# Patient Record
Sex: FEMALE | Race: BLACK OR AFRICAN AMERICAN | NOT HISPANIC OR LATINO | URBAN - METROPOLITAN AREA
[De-identification: names, ages, dates, MRNs, and addresses within clinical notes are randomized per-mention and may not be internally consistent; named-entity substitution may affect disease eponyms.]

---

## 2017-02-20 ENCOUNTER — OFFICE VISIT (OUTPATIENT)
Dept: FAMILY MEDICINE | Facility: CLINIC | Age: 22
End: 2017-02-20

## 2017-02-20 VITALS
BODY MASS INDEX: 41.43 KG/M2 | SYSTOLIC BLOOD PRESSURE: 115 MMHG | HEART RATE: 85 BPM | WEIGHT: 270.2 LBS | OXYGEN SATURATION: 99 % | DIASTOLIC BLOOD PRESSURE: 81 MMHG | TEMPERATURE: 99.1 F

## 2017-02-20 DIAGNOSIS — Z00.129 ENCOUNTER FOR ROUTINE CHILD HEALTH EXAMINATION WITHOUT ABNORMAL FINDINGS: ICD-10-CM

## 2017-02-20 DIAGNOSIS — N92.6 MISSED PERIODS: Primary | ICD-10-CM

## 2017-02-20 DIAGNOSIS — F32.0 MILD SINGLE CURRENT EPISODE OF MAJOR DEPRESSIVE DISORDER (H): ICD-10-CM

## 2017-02-20 DIAGNOSIS — Z11.3 SCREEN FOR STD (SEXUALLY TRANSMITTED DISEASE): ICD-10-CM

## 2017-02-20 LAB
BACTERIA: NORMAL
CLUE CELLS: NORMAL
HCG UR QL: NEGATIVE
HIV 1+2 AB+HIV1 P24 AG SERPL QL IA: NEGATIVE
MOTILE TRICHOMONAS: NEGATIVE
ODOR: NORMAL
PH WET PREP: NORMAL
WBC WET PREP: NORMAL
YEAST: NORMAL

## 2017-02-20 NOTE — MR AVS SNAPSHOT
After Visit Summary   2/20/2017    Hector Rm    MRN: [id number]           Patient Information     Date Of Birth          1995        Visit Information        Provider Department      2/20/2017 2:10 PM Benoit Mak MD [facility]        Today's Diagnoses     Missed periods    -  1    Screen for STD (sexually transmitted disease)        Mild single current episode of major depressive disorder (H)           Follow-ups after your visit        Who to contact     Please call your clinic at [phone] to:    Ask questions about your health    Make or cancel appointments    Discuss your medicines    Learn about your test results    Speak to your doctor   If you have compliments or concerns about an experience at your clinic, or if you wish to file a complaint, please contact [facility] Patient Relations at [phone] or email us at [email or web address]         Additional Information About Your Visit        Care EveryWhere ID     This is your Care EveryWhere ID. This could be used by other organizations to access your [facility] medical records  [id number]        Your Vitals Were     Pulse Temperature Pulse Oximetry BMI (Body Mass Index)          85 99.1  F (37.3  C) (Oral) 99% 41.43 kg/m2         Blood Pressure from Last 3 Encounters:   02/20/17 115/81   11/08/16 107/68   10/28/16 100/63    Weight from Last 3 Encounters:   02/20/17 270 lb 3.2 oz (122.6 kg)   10/28/16 258 lb 12.8 oz (117.4 kg) (>99 %)*   12/04/15 248 lb 9.6 oz (112.8 kg) (>99 %)*     * Growth percentiles are based on CDC 2-20 Years data.              We Performed the Following     Chlamydia/Gono Amplified ([facility])     HCG Qualitative Urine (UPT)  ([facility])     HIV Ag/Ab Screen [place] ([facility])     Syphilis Screen [place] ([facility])     Wet Prep ([facility])          Today's Medication Changes          These changes are accurate as of: 2/20/17  3:17 PM.  If you have any  questions, ask your nurse or doctor.               Start taking these medicines.        Dose/Directions    FLUoxetine 20 MG capsule   Commonly known as:  PROzac   Used for:  Mild single current episode of major depressive disorder (H)   Started by:  Benoit Mak MD        Dose:  20 mg   Take 1 capsule (20 mg) by mouth daily   Quantity:  30 capsule   Refills:  1            Where to get your medicines      These medications were sent to [facility] - [place], [place] - [address], [place] [address]     Phone:  [phone]     FLUoxetine 20 MG capsule                Primary Care Provider Office Phone # Fax #    Jasmina Enoch Tracey [phone]       [facility] [address]  [facility] [address]        Thank you!     Thank you for choosing [facility]  for your care. Our goal is always to provide you with excellent care. Hearing back from our patients is one way we can continue to improve our services. Please take a few minutes to complete the written survey that you may receive in the mail after your visit with us. Thank you!             Your Updated Medication List - Protect others around you: Learn how to safely use, store and throw away your medicines at www.disposemymeds.org.          This list is accurate as of: 2/20/17  3:17 PM.  Always use your most recent med list.                   Brand Name Dispense Instructions for use    FLUoxetine 20 MG capsule    PROzac    30 capsule    Take 1 capsule (20 mg) by mouth daily       medroxyPROGESTERone 150 MG/ML injection    DEPO-PROVERA    1 mL    Inject 1 mL (150 mg) into the muscle every 3 months

## 2017-02-21 LAB
C TRACH RRNA CVX QL NAA+PROBE: NEGATIVE
N GONORRHOEA RRNA SPEC QL NAA+PROBE: NEGATIVE
RPR SER QL: NORMAL

## 2017-02-21 ASSESSMENT — PATIENT HEALTH QUESTIONNAIRE - PHQ9: SUM OF ALL RESPONSES TO PHQ QUESTIONS 1-9: 22

## 2017-02-22 ENCOUNTER — TELEPHONE (OUTPATIENT)
Dept: FAMILY MEDICINE | Facility: CLINIC | Age: 22
End: 2017-02-22

## 2017-02-22 NOTE — TELEPHONE ENCOUNTER
Gave pt the following results.   /LuisRN        Component      Latest Ref Rng & Units 2/20/2017   Yeast Wet Prep      none None   Motile Trichomonas Wet Prep      Negative Negative   Clue Cells Wet Prep      NONE Present <20%   WBC WET PREP      2 - 5 2-5   Bacteria Wet Prep      None Few   pH Wet Prep      3.8 - 4.5 Not performed   Odor Wet Prep      NONE None   Chlamydia trac,Amplified Prb      Negative Negative   N gonorrhoeae,Amplified Prb      Negative Negative   HCG Qual Urine      Negative NEGATIVE   Syphilis Screen [place]      Non-Reactive Non-Reactive   HIV Antigen/Antibody      Negative Negative

## 2017-02-22 NOTE — TELEPHONE ENCOUNTER
[facility] phone call message- patient requesting results:    Test: Lab    Date of test: 2/20/17    Additional Comments: Patient would like a call back about her lab results.     OK to leave a message on voice mail? Yes      Primary language: English      needed? No    Call taken on February 22, 2017 at 10:37 AM by Domenic Fagan

## 2017-02-22 NOTE — PROGRESS NOTES
"  Child & Teen Check Up Year 18-20         Health History       Growth Percentile:    Wt Readings from Last 3 Encounters:   02/20/17 270 lb 3.2 oz (122.6 kg)   10/28/16 258 lb 12.8 oz (117.4 kg) (>99 %)*   12/04/15 248 lb 9.6 oz (112.8 kg) (>99 %)*     * Growth percentiles are based on [facility] 2-20 Years data.      Ht Readings from Last 2 Encounters:   10/28/16 5' 7.72\" (172 cm) (91 %)*   12/04/15 5' 6.75\" (169.5 cm) (83 %)*     * Growth percentiles are based on [facility] 2-20 Years data.    Normalized BMI data available only for age 0 to 20 years.    Visit Vitals: /81  Pulse 85  Temp 99.1  F (37.3  C) (Oral)  Wt 270 lb 3.2 oz (122.6 kg)  SpO2 99%  BMI 41.43 kg/m2  BP Percentile: Normalized stature-for-age data not available for patients older than 20 years.    Informant: Patient    Patient, Family speaks English and so an  was not used.  Family History:   Family History   Problem Relation Age of Onset     DIABETES No family hx of      Coronary Artery Disease No family hx of      Hypertension No family hx of      Hyperlipidemia No family hx of      CEREBROVASCULAR DISEASE No family hx of      Breast Cancer No family hx of      Colon Cancer No family hx of      Prostate Cancer No family hx of      Other Cancer No family hx of      Depression No family hx of      Anxiety Disorder No family hx of      MENTAL ILLNESS No family hx of      Substance Abuse No family hx of      Anesthesia Reaction No family hx of      Asthma No family hx of      OSTEOPOROSIS No family hx of      Genetic Disorder No family hx of      Thyroid Disease No family hx of      Obesity No family hx of      Unknown/Adopted No family hx of        Social History:   Social History     Social History     Marital status: Single     Spouse name: N/A     Number of children: N/A     Years of education: N/A     Social History Main Topics     Smoking status: Never Smoker     Smokeless tobacco: Never Used     Alcohol use No     Drug use: Yes     Sexual " activity: Yes     Partners: Male     Other Topics Concern     None     Social History Narrative       Medical History:   Past Medical History   Diagnosis Date     Depressive disorder      Diabetes (H)        Family History and past Medical History reviewed and unchanged/updated.      Vision Screen: Grossly normal  Hearing Screen: Grossly normal  Patient concerns: late on period.  Would like PAP and STD check    Daily Activities:    Nutrition:    Consider 1 chewable multivitamin daily. (gives 400 IU vitamin D daily. Especially in winter months or in darker skinned children.)    Environmental Risks:  TB exposure: No  Guns in house:None    Dental:  Have you been to a dentist this year? Yes and verbally encouraged family to continue to have annual dental check-up       Mental Health:  See PHQ-9            ROS   GENERAL: no recent fevers and activity level has been normal  SKIN: Negative for rash, birthmarks, acne, pigmentation changes  HEENT: Negative for hearing problems, vision problems, nasal congestion, eye discharge and eye redness  RESP: No cough, wheezing, difficulty breathing  CV: No cyanosis, fatigue with feeding  GI: Normal stools for age, no diarrhea or constipation   : Normal urination, no disharge or painful urination  MS: No swelling, muscle weakness, joint problems  NEURO: Moves all extremeties normally, normal activity for age  ALLERGY/IMMUNE: See allergy in history         Physical Exam:   /81  Pulse 85  Temp 99.1  F (37.3  C) (Oral)  Wt 270 lb 3.2 oz (122.6 kg)  SpO2 99%  BMI 41.43 kg/m2     GENERAL: Alert, well nourished, well developed, no acute distress, interacts appropriately for age  SKIN: skin is clear, no rash, acne, abnormal pigmentation or lesions  HEAD: The head is normocephalic.  EYES:The conjunctivae and cornea normal. PERRL, EOMI, Light reflex is symmetric and no eye movement on cover/uncover test. Sharp optic discs  EARS: The external auditory canals are clear and the tympanic  membranes are normal; gray and transluscent.  NOSE: Clear, no discharge or congestion  MOUTH/THROAT: The throat is clear, tonsils:normal, no exudate or lesions. Normal teeth without obvious abnormalities  NECK: The neck is supple and thyroid is normal, no masses  LYMPH NODES: No adenopathy  LUNGS: The lung fields are clear to auscultation,no rales, rhonchi, wheezing or retractions  HEART: The precordium is quiet. Rhythm is regular. S1 and S2 are normal. No murmurs.  ABDOMEN: The bowel sounds are normal. Abdomen soft, non tender,  non distended, no masses or hepatosplenomegaly.  F-GENITALIA: Normal female external genitalia. Madan stage 5.  Cervix normal .  WEt prep taken.  Cervix normal.  PAP done, no inguinal hernia present  EXTREMITIES: Symmetric extremities, FROM, no deformities. Spine is straight, no scoliosis  NEUROLOGIC: No focal findings. Cranial nerves grossly intact: DTR's normal. Normal gait, strength and tone         Assessment and Plan   Reason for Visit:   Chief Complaint   Patient presents with     Other     come here today for check up on STD, UPT, and everything per pt.      Wellness exam.  STD screening  Depression.  Start Fluoxetine. SE reviewed.  Safety plan enacted.    Immunizations:    Hx immunization reactions?  No  Immunization schedule reviewed: Yes:      Labs:  Urinalysis: once between ages 12 and 20   Hemoglobin: once for menstruating adolescents between ages 12 and 20     Schedule next visit in 2 years    Benoit Mak MD

## 2017-02-28 ENCOUNTER — RECORDS - HEALTHEAST (OUTPATIENT)
Dept: ADMINISTRATIVE | Facility: OTHER | Age: 22
End: 2017-02-28

## 2017-02-28 LAB
HIGH RISK?: NO
HPV REFLEX?: NORMAL
LAB AP ABNORMAL BLEEDING: NO
LAB AP BIRTH CONTROL/HORMONES: NORMAL
LAB AP CASE REPORT: NORMAL
LAB AP CERVICAL APPEARANCE: NORMAL
LAB AP GYN INTERPRETATION: NORMAL
LAB AP MALIGNANT?: NORMAL
LAB AP PATIENT STATUS: NORMAL
LAB AP PREVIOUS ABNL: NO
LAB AP PREVIOUS NORMAL: NORMAL
LAST MENS PERIOD: NORMAL
SPECIMEN ADEQUACY:: NORMAL

## 2017-05-04 ENCOUNTER — TELEPHONE (OUTPATIENT)
Dept: FAMILY MEDICINE | Facility: CLINIC | Age: 22
End: 2017-05-04

## 2017-05-08 ENCOUNTER — TELEPHONE (OUTPATIENT)
Dept: FAMILY MEDICINE | Facility: CLINIC | Age: 22
End: 2017-05-08

## 2017-05-08 ENCOUNTER — OFFICE VISIT (OUTPATIENT)
Dept: FAMILY MEDICINE | Facility: CLINIC | Age: 22
End: 2017-05-08

## 2017-05-08 VITALS
HEART RATE: 106 BPM | BODY MASS INDEX: 42.63 KG/M2 | DIASTOLIC BLOOD PRESSURE: 84 MMHG | SYSTOLIC BLOOD PRESSURE: 130 MMHG | WEIGHT: 271.6 LBS | HEIGHT: 67 IN

## 2017-05-08 RX ORDER — NEOMYCIN/BACITRACIN/POLYMYXINB 3.5-400-5K
OINTMENT (GRAM) TOPICAL 2 TIMES DAILY
Qty: 30 G | Refills: 1 | Status: SHIPPED | OUTPATIENT
Start: 2017-05-08 | End: 2017-05-15

## 2017-05-08 ASSESSMENT — ANXIETY QUESTIONNAIRES
3. WORRYING TOO MUCH ABOUT DIFFERENT THINGS: NEARLY EVERY DAY
1. FEELING NERVOUS, ANXIOUS, OR ON EDGE: MORE THAN HALF THE DAYS
2. NOT BEING ABLE TO STOP OR CONTROL WORRYING: NEARLY EVERY DAY
7. FEELING AFRAID AS IF SOMETHING AWFUL MIGHT HAPPEN: SEVERAL DAYS
GAD7 TOTAL SCORE: 16
5. BEING SO RESTLESS THAT IT IS HARD TO SIT STILL: MORE THAN HALF THE DAYS
6. BECOMING EASILY ANNOYED OR IRRITABLE: NEARLY EVERY DAY
4. TROUBLE RELAXING: MORE THAN HALF THE DAYS

## 2017-05-08 NOTE — PROGRESS NOTES
Preceptor attestation:  Patient seen and discussed with the resident. Assessment and plan reviewed with resident and agreed upon.  Supervising physician: Yuan Biggs  [facility]

## 2017-05-08 NOTE — PROGRESS NOTES
Primary Care Behavioral Health Consult Note  Meeting was: Unscheduled  Others present: Dr. Tracey present for beginning and end of consultation  Meeting lasted: 30 minutes    Identifying Information and Presenting Problem:  Provided behavioral health consultation for this patient regarding risk/safety assessment, safety planning, and care coordination as planned (See this provider's documentation from earlier today 5/8/2017 for contextual information). The patient is a 21 year old American female experiencing significant distress associated with multifaceted psychosocial stressors (e.g., Employment difficulties; Familial stressors; Limited emotional/social support; Other life stressors) and agreed to be seen by behavioral health today.    Topics Discussed/Interventions Provided:    Conducted risk/safety assessment. Today Hector Rm reported current/recent passive suicidal ideation, though denied current/recent active suicidal ideation/plan/intent. Endorsed recent self-harming behaviors two days ago, though denied more recent engagement.  In addition, she has notable risk factors for self-harm, including anxiety. However, risk is mitigated by commitment to family, absence of past suicide attempts, ability to volunteer a safety plan (See patient instructions) and history of seeking help when needed. Therefore, based on all available evidence including the factors cited above, she does not appear to be at imminent risk for self-harm, does not meet criteria for a 72-hr hold, and therefore remains appropriate for ongoing outpatient level of care with follow-up.      Completed multi-step safety plan. Worked with the patient to collaboratively develop a comprehensive, multi-step safety plan. Appeared receptive, and expressed confidence that she would be able to utilize the plan and/or the included crisis resources if needed. Of note, incorporated distress tolerance skills (i.e., TI: Temperature, Intense  physical activity) into the patient's safety plan after providing psychoeducation regarding the skills and thoroughly reviewing potential options for their implementation. Please note that paced breathing was intentionally omitted from the patient's plan, in accordance with the patient's preferences.       Coordinated care. Briefly discussed the role of the behavioral health service in the clinic and educated the patient on psychotherapy services, both those offered in-house and those within the community. Shared that she met with a  who had worked at her school, but reported she had not previously met with a mental health professional for any form of counseling/psychotherapy. Expressed interest in getting established with psychotherapy services moving forward. Offered to meet with the patient for a visit moving forward to complete a diagnostic assessment and work towards getting the patient established with psychotherapy services, either in-house or within the community. Expressed interest in doing this, and agreed to meet with this provider on 5/22/2017. The patient was given the opportunity to ask questions. Denied having questions at the time of this consultation. Facilitated the scheduling of the visit with this provider immediately following the conclusion of the current consultation.      Assessment:   Mental Status: Hector Rm appeared generally alert and oriented. Dress was casual and appropriate to the weather and occasion. Grooming and hygiene were good. Eye contact was good. Speech was of normal volume and rate and was clear, coherent, and relevant. Mood was depressed with congruent and tearful affect. Thought processes were relevant, logical and goal-directed. Thought content was WNL with no evidence of psychotic or paranoid features. Reported current/recent passive suicidal ideation, though denied current/recent active suicidal ideation/plan/intent. Endorsed recent self-harming  behaviors two days ago though denied more recent engagement. No evidence of homicidal ideation/plan/intent. Denied acute concerns regarding safety. Memory appeared grossly intact. Insight and judgment appeared good and patient exhibited good impulse control during the appointment.     Does the patient appear to be at imminent risk of harm to self/others at this time? No, though follow-up is warranted    PHQ-9 SCORE 10/28/2016 2/20/2017 5/8/2017   Total Score 16 22 18     RADHA-7 SCORE 5/8/2017   Total Score 16     Diagnoses (PROVISIONAL):    Deferred    Plan:    Consulted with Dr. Tracey, Dr. Biggs, and Becca Weathers following current consultation for purposes of care coordination.     Patient scheduled to meet with this provider on 5/22/2017 to complete diagnostic assessment and coordinate care.    Patient to follow-up with Dr. Tracey moving forward if needed (Patient currently has an appointment scheduled for 5/11/2017).    Patient to utilize safety plan and/or crisis resources if needed.    Nathaniel Lombardi, Ph.D.  Behavioral Health Fellow

## 2017-05-08 NOTE — PROGRESS NOTES
"[address] [address]  [phone]         SUBJECTIVE       Hector Rm is a 21 year old  female with a PMH significant for:     Patient Active Problem List   Diagnosis     Obesity     Moderate major depression (H)     Deliberate self-cutting     She presents with concerns of worsening mood and recent episode of self cutting.    Patient has a history of self cutting to help her cope with her emotions since middle school. She last cut 8 months ago prior to this most recent incident. She has not been to a therapist before but was seen in clinic in February and was started on Prozac for her depression. She reports only taking it for a few weeks and then stopping it since it did not help. She has not sought any any other medical help since then.     Two days ago, she felt very stressed and overwhelmed and had an argument with her family members. She went to her room and used a  to inflict wounds on her arms. She called clinic today to speak to a therapist and counselor due to her wanting help. She spoke with our SW, Becca Weathers, and Behavior Fellow, Dr. Lombardi, who recommended patient be seen today.  Patient notes she is feeling very overwhelmed and sad. 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 She notes she feels limited support. She is not interested in starting medication because her mom has depression and she saw how \"she has to take so many meds and I don't like that\". She is interested in therapy. She does have passive SI due to thinking it would make things easier, but no active plans or attempts. No HI. Her arm does not hurt, no fevers, chills, or drainage. She reports cutting at her arms only.    Past Medical History:  Past Medical " History:   Diagnosis Date     Depressive disorder      Diabetes (H)        Past Surgical History:  No past surgical history on file.    Family History:  Family History   Problem Relation Age of Onset     DIABETES No family hx of      Coronary Artery Disease No family hx of      Hypertension No family hx of      Hyperlipidemia No family hx of      CEREBROVASCULAR DISEASE No family hx of      Breast Cancer No family hx of      Colon Cancer No family hx of      Prostate Cancer No family hx of      Other Cancer No family hx of      Depression No family hx of      Anxiety Disorder No family hx of      MENTAL ILLNESS No family hx of      Substance Abuse No family hx of      Anesthesia Reaction No family hx of      Asthma No family hx of      OSTEOPOROSIS No family hx of      Genetic Disorder No family hx of      Thyroid Disease No family hx of      Obesity No family hx of      Unknown/Adopted No family hx of        Social History:  Social History     Social History     Marital status: Single     Spouse name: N/A     Number of children: N/A     Years of education: N/A     Occupational History     Not on file.     Social History Main Topics     Smoking status: Never Smoker     Smokeless tobacco: Never Used     Alcohol use No     Drug use: Yes     Sexual activity: Yes     Partners: Male     Other Topics Concern     Not on file     Social History Narrative       Medications:   Current Outpatient Prescriptions   Medication Sig Dispense Refill     METFORMIN HCL PO        neomycin-bacitracin-polymyxin (NEOSPORIN) 5-400-5000 ointment Apply topically 2 times daily for 7 days 30 g 1     medroxyPROGESTERone (DEPO-PROVERA) 150 MG/ML injection Inject 1 mL (150 mg) into the muscle every 3 months 1 mL 0       Allergies:     Allergies   Allergen Reactions     Nka [No Known Allergies]        PMH, Surgical Hx, Family Hx, Social Hx, Medications and Allergies were reviewed and updated as needed.        REVIEW OF SYSTEMS     Please see  "HPI        OBJECTIVE     Vitals:    05/08/17 1444   BP: 130/84   Pulse: 106   Weight: 271 lb 9.6 oz (123.2 kg)   Height: 5' 7\" (170.2 cm)     Body mass index is 42.54 kg/(m^2).    Constitutional: Pleasant, young  female seated comfortably. Tearful at several times during interview.  Pulm: Unlabored respirations on room air.  Skin:dozens of superifical lacerations along left arm. No swelling, surrounding erythema, or drainage. Non-tender with palpation.  Psych: Sad mood, anxious affect, tearful. Appropriate eye contact and dress.     PHQ-9 SCORE 10/28/2016 2/20/2017 5/8/2017   Total Score 16 22 18     RADHA-7 SCORE 5/8/2017   Total Score 16           ASSESSMENT AND PLAN     Hector Rm is a 21 year old  female with a PMH significant for MDD, self-injury, and obesity who presents for concerns of worsening mood.    1. Laceration of skin: Superficial laceration with no signs of infection on left arm. Will prophylactically treat with topical abx. Discussed signs and symptoms of worsening infection.  - neomycin-bacitracin-polymyxin (NEOSPORIN) 5-400-5000 ointment; Apply topically 2 times daily for 7 days  Dispense: 30 g; Refill: 1    2. MDD, Self-Injury: Appreciate assistance of [facility]IR[facility], Becca Weathers, and Behavioral Fellow, Dr. Lombardi. Patient declines any medication, but interested in therapy. Contracted for safety and safety plan created by Dr. Lombardi today.  -Follow up with Dr. Lombardi in 2 weeks for diagnostic assessment  -Follow up with me as needed to discuss any concerns or possible initation of medication if interested in the future  -Crisis resources provided    RTC in 2 weeks for follow up of diagnostic assesment with Dr. Lombardi or sooner if develops new or worsening symptoms.    Options for treatment and/or follow-up care were reviewed with the patient who was engaged and actively involved in the decision making process and verbalized understanding of the options discussed and was " satisfied with the final plan.    Jasmina Tracey MD PGY-2  [facility]  Pager: [phone]    Patient discussed with Dr. Reynaldo Biggs, attending physician, who agrees with the plan.

## 2017-05-08 NOTE — TELEPHONE ENCOUNTER
Patient called the clinic and asked to speak with a  or counselor. Patient told me that she is a cutter and she cut herself two days ago, she said that she hasn't done it in a long time. Patient said that she was arguing with her sister about their little brother and things got out of hand. Patient did not go to the hospital after cutting herself, she stayed home. Patient said that she was upset and her feelings were hurt. She later acknowledged that she cuts to feel pain and to take her mind off of things that happen. Her family knows about her history of cutting but they do not know that she did it this time. Patient said that she feels like everything is going wrong in her life and it makes her feel like she doesn't want to be here anymore. Patient said it is hard when she fails, especially when she try's and fails.    Patient lives with her mom, sister and brother but she does not feel that she has any support at home. She said that is has been 8 months since she last cut herself. She has been cutting since middle school. I asked patient what she has done in the past to help her cope after cutting. Patient told me that she does not have hope, she has always been quiet about it and she's never really talked to anyone about what is going on. Asked what made patient call today and she stated that she first tried to call [facility] but no one answered so  She called us. Patient said that she has problems at home, she loves her family but she doesn't want to stay there so she called [facility] for shelter information or resources. She does not want to go to a shelter but stated that being at home makes her feel weak.     At that time Dr Lombardi was able to join the conversation, see his note for additional details. Patient acknowledged passive suicidal ideation but discussed that she does not have a plan at this time. She was in agreement with coming in for an appointment later today with Dr Tracey  and Dr Lombardi. Patient later said that she hasn't talked to anyone about this because she didn't want them to think that she was crazy. Told her that she is brave to have called us and shared her story, encouraged her for this and thanked her for reaching out.    Patient said that she is on a medication for her depression but she was not sure what it was. She agreed to bring it to the appointment today and I told her that she can talk about that with Dr Tracey too.    Updated Dr Tracey.    Becca Weathers

## 2017-05-08 NOTE — TELEPHONE ENCOUNTER
Participated in phone contact with patient, at the request of Becca Weathers, in order to assist with risk/safety assessment and coordinate care. Endorsed current passive suicidal ideation, though denied active suicidal ideation/plan/intent and denied acute concerns regarding safety. Acknowledged engaging in self-harming behavior (i.e., cutting) two days ago, though denied more recent engagement. Reported feeling overwhelmed/stressed, and expressed interest in obtaining additional support. Noted she had not previously sought out support due to concerns of how she would be perceived. Reviewed patient's medical record, and observed she previously scheduled an appointment with Dr. Tracey on 5/11/2017. Becca Weathers reviewed the clinic schedule for today 5/8/2017, and noted Dr. Tracey would be available to meet with patient at 2:30PM. Expressed interest, and appointment was scheduled. Of note, informed patient that this provider would plan to meet with her during her appointment with Dr. Tracey for purposes of risk/safety assessment, safety planning, and coordination of care. Expressed appreciation, understanding, and agreement. Requested patient follow-up with the clinic should she be unable to attend today's appointment. Agreed to do so, though reported being confident she would attend the appointment. Prior to conclusion of current phone contact, provided patient with crisis resources (i.e., Adult Mental Health Urgent Care: [phone]; Crisis Connection: [phone]). Expressed appreciation. This provider to consult with Dr. Tracey prior to the appointment for purposes of care coordination.    Nathaniel Lombardi, Ph.D.  Behavioral Health Fellow

## 2017-05-08 NOTE — PATIENT INSTRUCTIONS
Safety Plan  Step 1: Warning Signs (thoughts, images, mood, situations, behaviors) that a crisis may be developing  1. Increased thoughts all at one time (e.g., Things I need to work on, things I'm worried about, things I'm angry about)  2. Becoming tearful or crying  3. Tingling feeling in my body, Heart racing, Heavier breathing  4. More intense emotions all at one time    Step 2: Internal coping strategies - Things I can do to take my mind off my problems without contacting another person (relaxation technique, physical activity)  1. Listen to music ([employer] and Juan David Cook)  2. Writing/Journaling   3. Go for a walk  4. TI skills (Temperature-cold shower; Intense physical activity-paced walking)    Step 3: People and social settings that help to distract me  Place: [facility]    Place: [facility]   People: Father    Step 4: People whom I can ask for help  Name: Father (Lukas)-   Phone: [phone]  Name: Grandmother (Paternal)- Phone: [phone]  Name: [facility]-  Phone: [phone]     Step 5: Professionals or agencies I can contract during a crisis  - Crisis Connection: [phone] (crisis counseling)  - [facility] Suicide Hotline: 927.852.3741 (suicidal thoughts)  - [facility]: [phone] (psychiatric crisis, but can transport self)  - [facility]: [phone] (psychiatric crisis, but cannot transport self)  - Psychiatric Adult Crisis Line: [phone] (crisis counseling and walk-in urgent care mental health)  - [facility] Multilingual Crisis Line: [phone]  -Suicide Prevention Lifeline Phone: 2-674-575-TALK (0993)  -If in immediate danger of harming self/others, call 9-1-5.    Step 6: Making the environment safe   1. Remove  from my room (Throw it away)  The one thing that is most important to me and worth living for is:  -My family  -Hope for the future    Eric MCKEON & George MCCORMICK (2008). Suicide Safety Plan Template. Publisher: [employer]PH[employer]IE [employer])  Mental Health Program and Suicide Prevention Resource Center    -We will schedule a visit with John for 3:30PM on 5/22/17.  - the ointment from downstairs and apply twice a day (Thin layer) for a week at any time of day.  -Schedule visit with Dr. Tracey if needed moving forward.

## 2017-05-08 NOTE — TELEPHONE ENCOUNTER
I have reviewed and agree with the behavioral health fellow's documentation for this visit.  I did not personally see the patient.  Linda Koenig, PhD., LP

## 2017-05-08 NOTE — MR AVS SNAPSHOT
After Visit Summary   5/8/2017    Hector Rm    MRN: [id number]           Patient Information     Date Of Birth          1995        Visit Information        Provider Department      5/8/2017 2:30 PM Jasmina Tracey MD [facility]        Today's Diagnoses     Laceration of skin    -  1      Care Instructions    Safety Plan  Step 1: Warning Signs (thoughts, images, mood, situations, behaviors) that a crisis may be developing  1. Increased thoughts all at one time (e.g., Things I need to work on, things I'm worried about, things I'm angry about)  2. Becoming tearful or crying  3. Tingling feeling in my body, Heart racing, Heavier breathing  4. More intense emotions all at one time    Step 2: Internal coping strategies - Things I can do to take my mind off my problems without contacting another person (relaxation technique, physical activity)  1. Listen to music ([employer] and Juan David [employer])  2. Writing/Journaling   3. Go for a walk  4. TI skills (Temperature-cold shower; Intense physical activity-paced walking)    Step 3: People and social settings that help to distract me  Place: [facility]    Place: [facility]   People: Father    Step 4: People whom I can ask for help  Name: Father (Lukas)-   Phone: [phone]  Name: Grandmother (Paternal)- Phone: [phone]  Name: [facility]-  Phone: [phone]     Step 5: Professionals or agencies I can contract during a crisis  - Crisis Connection: [phone] (crisis counseling)  - [facility] Suicide Hotline: 111.830.2020 (suicidal thoughts)  - Behavioral Emergency Center: [phone] (psychiatric crisis, but can transport self)  - [facility]: [phone] (psychiatric crisis, but cannot transport self)  - [facility] Adult Crisis Line: [phone] (crisis counseling and walk-in urgent care mental health)  - [facility] Multilingual Crisis Line: 757.456.4990  -Suicide Prevention Lifeline Phone: 3-067-732-TALK (6631)  -If in immediate danger of  harming self/others, call 9-1-1.    Step 6: Making the environment safe   1. Remove  from my room (Throw it away)  The one thing that is most important to me and worth living for is:  -My family  -Hope for the future    Eric MCKEON & George MCCORMICK (2008). Suicide Safety Plan Template. Publisher: [employer] Mental Health Program and Suicide Prevention Resource Center    -We will schedule a visit with John for 3:30PM on 5/22/17.  - the ointment from downstairs and apply twice a day (Thin layer) for a week at any time of day.  -Schedule visit with Dr. Tracey if needed moving forward.        Follow-ups after your visit        Your next 10 appointments already scheduled     May 11, 2017 11:20 AM CDT   Return Visit with Jasmina Tracey MD   [facility]    [address] [place] [address]   [phone]              Who to contact     Please call your clinic at [phone] to:    Ask questions about your health    Make or cancel appointments    Discuss your medicines    Learn about your test results    Speak to your doctor   If you have compliments or concerns about an experience at your clinic, or if you wish to file a complaint, please contact [facility] Patient Relations at [phone] or email us at [email or web address]         Additional Information About Your Visit        MyChart Information     [employer] is an electronic gateway that provides easy, online access to your medical records. With [employer], you can request a clinic appointment, read your test results, renew a prescription or communicate with your care team.     To sign up for [employer] visit the website at [email or web address]   You will be asked to enter the access code listed below, as well as some personal information. Please follow the directions to create your username and password.     Your access code is:  [email or web address]  Expires: 2017  3:38 PM     Your access code will  in 90 days. If you need help or a new code, please contact your [facility] or call [phone] for assistance.        Care EveryWhere ID     This is your Care EveryWhere ID. This could be used by other organizations to access your [place] medical records  [id number]        Your Vitals Were     Pulse Height BMI (Body Mass Index)             106 5' 7\" (170.2 cm) 42.54 kg/m2          Blood Pressure from Last 3 Encounters:   17 130/84   17 115/81   16 107/68    Weight from Last 3 Encounters:   17 271 lb 9.6 oz (123.2 kg)   17 270 lb 3.2 oz (122.6 kg)   10/28/16 258 lb 12.8 oz (117.4 kg) (>99 %)*     * Growth percentiles are based on [facility] 2-20 Years data.              Today, you had the following     No orders found for display         Today's Medication Changes          These changes are accurate as of: 17  3:39 PM.  If you have any questions, ask your nurse or doctor.               Start taking these medicines.        Dose/Directions    neomycin-bacitracin-polymyxin 5-400-5000 ointment   Used for:  Laceration of skin   Started by:  Jasmina Tracey MD        Apply topically 2 times daily for 7 days   Quantity:  30 g   Refills:  1            Where to get your medicines      These medications were sent to [facility] - [place], [place] - [address], [place] [address]     Phone:  [phone]     neomycin-bacitracin-polymyxin 5-400-5000 ointment                Primary Care Provider Office Phone # Fax #    Jasmina Tracey [phone]       [facility] [address] [place] [address]        Thank you!     Thank you for choosing [facility]  for your care. Our goal is always to provide you with excellent care. Hearing back from our patients is one way we can continue to improve our services. Please take " a few minutes to complete the written survey that you may receive in the mail after your visit with us. Thank you!             Your Updated Medication List - Protect others around you: Learn how to safely use, store and throw away your medicines at www.disposemymeds.org.          This list is accurate as of: 5/8/17  3:39 PM.  Always use your most recent med list.                   Brand Name Dispense Instructions for use    medroxyPROGESTERone 150 MG/ML injection    DEPO-PROVERA    1 mL    Inject 1 mL (150 mg) into the muscle every 3 months       METFORMIN HCL PO          neomycin-bacitracin-polymyxin 5-400-5000 ointment     30 g    Apply topically 2 times daily for 7 days

## 2017-05-09 ASSESSMENT — PATIENT HEALTH QUESTIONNAIRE - PHQ9: SUM OF ALL RESPONSES TO PHQ QUESTIONS 1-9: 18

## 2017-05-09 ASSESSMENT — ANXIETY QUESTIONNAIRES: GAD7 TOTAL SCORE: 16

## 2017-05-10 PROBLEM — Z72.89 DELIBERATE SELF-CUTTING: Status: ACTIVE | Noted: 2017-05-10

## 2017-05-22 ENCOUNTER — TELEPHONE (OUTPATIENT)
Dept: FAMILY MEDICINE | Facility: CLINIC | Age: 22
End: 2017-05-22

## 2017-05-22 NOTE — TELEPHONE ENCOUNTER
This provider placed an outreach call to the patient as she did not show for her scheduled appointment today 5/22/2017. Unable to reach the patient. Left a message requesting the patient to call the clinic and re-schedule if she is interested. One additional outreach call will be placed by this provider, pending callback from the patient and/or the rescheduling of the patient's appointment.    Nathaniel Lombardi, PhD   Behavioral Health Fellow

## 2017-05-30 ENCOUNTER — TELEPHONE (OUTPATIENT)
Dept: PSYCHOLOGY | Facility: CLINIC | Age: 22
End: 2017-05-30

## 2017-05-30 NOTE — TELEPHONE ENCOUNTER
This provider placed a second outreach call to the patient as she did not show for her scheduled appointment on 5/22/2017. Unable to reach the patient. Left a message requesting the patient to call the clinic and re-schedule if she is interested. No additional outreach calls planned by this provider, and the patient will be mailed a letter.    Nathaniel Lombardi, PhD   Behavioral Health Fellow

## 2017-08-05 ENCOUNTER — HEALTH MAINTENANCE LETTER (OUTPATIENT)
Age: 22
End: 2017-08-05

## 2017-08-16 ENCOUNTER — OFFICE VISIT (OUTPATIENT)
Dept: FAMILY MEDICINE | Facility: CLINIC | Age: 22
End: 2017-08-16

## 2017-08-16 VITALS
OXYGEN SATURATION: 98 % | TEMPERATURE: 99.5 F | HEART RATE: 89 BPM | WEIGHT: 271.8 LBS | BODY MASS INDEX: 42.57 KG/M2 | DIASTOLIC BLOOD PRESSURE: 82 MMHG | SYSTOLIC BLOOD PRESSURE: 127 MMHG

## 2017-08-16 DIAGNOSIS — Z11.3 ROUTINE SCREENING FOR STI (SEXUALLY TRANSMITTED INFECTION): ICD-10-CM

## 2017-08-16 DIAGNOSIS — Z30.013 ENCOUNTER FOR INITIAL PRESCRIPTION OF INJECTABLE CONTRACEPTIVE: ICD-10-CM

## 2017-08-16 DIAGNOSIS — S39.012A BACK STRAIN, INITIAL ENCOUNTER: ICD-10-CM

## 2017-08-16 DIAGNOSIS — N89.8 VAGINAL DISCHARGE: Primary | ICD-10-CM

## 2017-08-16 DIAGNOSIS — K59.00 CONSTIPATION, UNSPECIFIED CONSTIPATION TYPE: ICD-10-CM

## 2017-08-16 LAB
HCG UR QL: NEGATIVE
TSH SERPL DL<=0.05 MIU/L-ACNC: 0.75 UIU/ML (ref 0.3–5)

## 2017-08-16 RX ORDER — CYCLOBENZAPRINE HCL 5 MG
5 TABLET ORAL 3 TIMES DAILY PRN
Qty: 42 TABLET | Refills: 0 | Status: SHIPPED | OUTPATIENT
Start: 2017-08-16 | End: 2017-08-28

## 2017-08-16 RX ORDER — METRONIDAZOLE 7.5 MG/G
1 GEL VAGINAL AT BEDTIME
Qty: 70 G | Refills: 0 | Status: SHIPPED | OUTPATIENT
Start: 2017-08-16 | End: 2017-08-21

## 2017-08-16 ASSESSMENT — PATIENT HEALTH QUESTIONNAIRE - PHQ9: SUM OF ALL RESPONSES TO PHQ QUESTIONS 1-9: 19

## 2017-08-16 NOTE — MR AVS SNAPSHOT
After Visit Summary   8/16/2017    Hector Rm    MRN: [id number]           Patient Information     Date Of Birth          1995        Visit Information        Provider Department      8/16/2017 4:10 PM Christie Hair MD [facility]        Today's Diagnoses     Vaginal discharge    -  1    Constipation, unspecified constipation type        Routine screening for STI (sexually transmitted infection)        Back strain, initial encounter        Encounter for initial prescription of injectable contraceptive          Care Instructions    Referral sent to [facility]  Phone: [phone]  Fax: [phone]  Clinic will contact patient to schedule  es/August 17, 2017              Follow-ups after your visit        Additional Services     PHYSICAL THERAPY REFERRAL       PT/OT REFERRAL  Hector Rm  1995  Phone #: [phone] (home)    needed: No  Language: English    PT/OT  Facility:   Any facility    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    Precautions/Contraindications: None    Imaging Studies: No    Surgical Procedure/Test Results: None    Treatment Goals:   Increase: Strength and ROM    Prognosis: good    Visits: Up to 12    Evaluate: Evaluate and treat                  Who to contact     Please call your clinic at [phone] to:    Ask questions about your health    Make or cancel appointments    Discuss your medicines    Learn about your test results    Speak to your doctor   If you have compliments or concerns about an experience at your clinic, or if you wish to file a complaint, please contact [facility] Patient Relations at [phone] or email us at [email or web address]         Additional Information About Your Visit        [employer] Information     [employer] is an electronic gateway that provides easy, online access to your medical records. With [employer], you can request a clinic  appointment, read your test results, renew a prescription or communicate with your care team.     To sign up for [employer] original productshart visit the website at [email or web address]   You will be asked to enter the access code listed below, as well as some personal information. Please follow the directions to create your username and password.     Your access code is: [email or web address]  Expires: 2017  5:54 PM     Your access code will  in 90 days. If you need help or a new code, please contact your [facility] or call [phone] for assistance.        Care EveryWhere ID     This is your Care EveryWhere ID. This could be used by other organizations to access your [place] medical records  [id number]        Your Vitals Were     Pulse Temperature Pulse Oximetry BMI (Body Mass Index)          89 99.5  F (37.5  C) (Oral) 98% 42.57 kg/m2         Blood Pressure from Last 3 Encounters:   17 127/82   17 130/84   17 115/81    Weight from Last 3 Encounters:   17 271 lb 12.8 oz (123.3 kg)   17 271 lb 9.6 oz (123.2 kg)   17 270 lb 3.2 oz (122.6 kg)              We Performed the Following     Chlamydia/Gono Amplified ([facility])     HCG Qualitative Urine (UPT)  ([facility])     HIV Ag/Ab Screen [place] ([facility])     INJECTION INTRAMUSCULAR OR SUB-Q     medroxyPROGESTERone (DEPO-PROVERA) injection 150 mg (Charge)     PHYSICAL THERAPY REFERRAL     Syphilis Screen [place] (RPR/VDRL) ([facility])     Thyroid [place] ([facility])          Today's Medication Changes          These changes are accurate as of: 17 11:59 PM.  If you have any questions, ask your nurse or doctor.               Start taking these medicines.        Dose/Directions    cyclobenzaprine 5 MG tablet   Commonly known as:  FLEXERIL   Used for:  Back strain, initial encounter   Started by:  Christie Hair MD        Dose:  5 mg   Take 1 tablet (5 mg) by mouth 3 times daily as needed (back pain)    Quantity:  42 tablet   Refills:  0       metroNIDAZOLE 0.75 % vaginal gel   Commonly known as:  METROGEL   Used for:  Vaginal discharge   Started by:  Christie Hair MD        Dose:  1 applicator   Place 1 applicator (5 g) vaginally At Bedtime for 5 days   Quantity:  70 g   Refills:  0            Where to get your medicines      These medications were sent to [facility] - [place], [place] - [address], [place] [address]     Phone:  [phone]     cyclobenzaprine 5 MG tablet    metroNIDAZOLE 0.75 % vaginal gel         Some of these will need a paper prescription and others can be bought over the counter.  Ask your nurse if you have questions.     You don't need a prescription for these medications     medroxyPROGESTERone 150 MG/ML injection                Primary Care Provider Office Phone # Fax #    Jasmina Enoch Tracey [phone]       [address] [place] [address]        Equal Access to Services     ANUPAM BARRIOS AH: Hadii renato shaw hadasho Soomaali, waaxda luqadaha, qaybta kaalmada adeegyada, waxay silvestrein hayanne marie small . So [facility] [phone].    ATENCIÓN: Si brandon bhatt, tiene a frankel disposición servicios gratuitos de asistencia lingüística. Llame al [phone].    We comply with applicable federal civil rights laws and [place] laws. We do not discriminate on the basis of race, color, national origin, age, disability sex, sexual orientation or gender identity.            Thank you!     Thank you for choosing [facility]  for your care. Our goal is always to provide you with excellent care. Hearing back from our patients is one way we can continue to improve our services. Please take a few minutes to complete the written survey that you may receive in the mail after your visit with us. Thank you!             Your Updated Medication List - Protect others around you: Learn how to safely use, store and throw away your medicines at  www.disposemymeds.org.          This list is accurate as of: 8/16/17 11:59 PM.  Always use your most recent med list.                   Brand Name Dispense Instructions for use Diagnosis    cyclobenzaprine 5 MG tablet    FLEXERIL    42 tablet    Take 1 tablet (5 mg) by mouth 3 times daily as needed (back pain)    Back strain, initial encounter       medroxyPROGESTERone 150 MG/ML injection    DEPO-PROVERA    1 mL    Inject 1 mL (150 mg) into the muscle every 3 months    Encounter for initial prescription of injectable contraceptive       METFORMIN HCL PO           metroNIDAZOLE 0.75 % vaginal gel    METROGEL    70 g    Place 1 applicator (5 g) vaginally At Bedtime for 5 days    Vaginal discharge

## 2017-08-16 NOTE — NURSING NOTE
Per Dr. Hair, the depo shot was given.     I administered the following to Hector Rm.    MEDICATION: Medroxyprogesterone 150 mg  ROUTE: IM  SITE: RUQ - Gluteus  DOSE: 150 mg per ml  LOT #: [id number]  :  [employer]   EXPIRATION DATE:  01 / 2022  NDC#: [address]-7   The reminder card was given to patient.  Her next depo is due between 11/01/2017 - 11/15/2017.      Was entire vial of medication used? Yes    Name of provider who requested the injection: Dr. Hair  Name of provider on site (faculty or community preceptor) at the time of performing the injection: Dr. Hair    November Paw, A

## 2017-08-16 NOTE — LETTER
August 18, 2017      Hector Rm  [address] [place] [address]        Dear Hector,    Please see below for your test results.    Thank you for coming to clinic.    Good news - your syphilis, HIV, gonorrhea, and chlamydia tests were all negative.    Your pregnancy test was also negative, and your thyroid test was normal.    Resulted Orders   HCG Qualitative Urine (UPT)  ([facility])   Result Value Ref Range    HCG Qual Urine NEGATIVE Negative   Thyroid [place] ([facility])   Result Value Ref Range    TSH 0.75 0.30 - 5.00 uIU/mL    Narrative    Test performed by:  [facility] [address] [place] [address]   HIV Ag/Ab Screen [place] ([facility])   Result Value Ref Range    HIV Antigen/Antibody Negative Negative    Narrative    Test performed by:  [facility] [address] [place] [address]   Syphilis Screen [place] (RPR/VDRL) ([facility])   Result Value Ref Range    Syphilis Screen [place] Non-Reactive Non-Reactive    Narrative    Test performed by:  [facility] [address] [place] [address]   Chlamydia/Gono Amplified ([facility])   Result Value Ref Range    Chlamydia trac,Amplified Prb Negative Negative    N gonorrhoeae,Amplified Prb Negative Negative    Narrative    Test performed by:  [facility] [address] [place] [address]       If you have any questions, please call the clinic to make an appointment.    Sincerely,    Christie Hair MD

## 2017-08-17 ENCOUNTER — AMBULATORY - HEALTHEAST (OUTPATIENT)
Dept: ADMINISTRATIVE | Facility: REHABILITATION | Age: 22
End: 2017-08-17

## 2017-08-17 DIAGNOSIS — S39.012A BACK STRAIN: ICD-10-CM

## 2017-08-17 LAB — HIV 1+2 AB+HIV1 P24 AG SERPL QL IA: NEGATIVE

## 2017-08-17 NOTE — PATIENT INSTRUCTIONS
Referral sent to [facility]  Phone: [phone]  Fax: [phone]  Clinic will contact patient to schedule  es/August 17, 2017

## 2017-08-18 LAB
C TRACH RRNA SPEC QL NAA+PROBE: NEGATIVE
N GONORRHOEA RRNA SPEC QL NAA+PROBE: NEGATIVE
RPR SER QL: NORMAL

## 2017-08-25 PROBLEM — Z30.013 ENCOUNTER FOR INITIAL PRESCRIPTION OF INJECTABLE CONTRACEPTIVE: Status: ACTIVE | Noted: 2017-08-25

## 2017-08-25 RX ORDER — MEDROXYPROGESTERONE ACETATE 150 MG/ML
150 INJECTION, SUSPENSION INTRAMUSCULAR
Qty: 1 ML | Refills: 3 | OUTPATIENT
Start: 2017-08-25 | End: 2018-08-25

## 2017-08-25 NOTE — PROGRESS NOTES
Subjective   Hector Rm is a 21-year-old female with a history of constipation and recurrent vaginal discharge who presents for 2 reasons: STI check and back pain.    Regarding the STI check, she wonders if she might have a yeast infection because she has had itchy, malodorous vaginal discharge, but she has not been sexually active.  The discharge is not clumpy.  She also complains of a bump on her perineum that is itching and burning.  No fever, nausea, vomiting, or pelvic pain.  She would also like to begin Depo for contraception.    Regarding her back, she says that for the past 10 days is felt really tight and goes down to her buttocks bilaterally.  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  The back pain is aggravated by her constipation, she sometimes has to strain to have a bowel movement.  She has been trying to increase her water intake and eat more fruits and vegetables to help with this with little effect.  She denies any incontinence.  No injury or trauma to the back.    Social: Non-smoker.    Objective   Vitals: /82  Pulse 89  Temp 99.5  F (37.5  C) (Oral)  Wt 271 lb 12.8 oz (123.3 kg)  SpO2 98%  BMI 42.57 kg/m2  General: Pleasant. Young woman. Obese. No distress.  : External genitalia normal to inspection. White vaginal discharge collected for wet prep. Cervix normal in appearance. No cervical motion tenderness.  Back: Normal to inspection.  No tenderness over spinous processes.  No tenderness over paraspinal muscles.  No tenderness over SI joints.  Normal ROM in flexion, extension, lateral flexion, and torsion.  Normal heel (L4) and toe (S1) walking.  5/5 strength on resisted great toe dorsiflexion (L5).  Normal patellar reflexes (L4) and Achilles reflexes (S1).  Negative straight leg raise bilaterally.      Labs:  Results for orders placed or performed in visit on 08/16/17   HCG Qualitative Urine (UPT)  ([facility])   Result Value Ref Range    HCG Qual Urine NEGATIVE Negative   Thyroid Ponce ([facility])   Result Value Ref Range    TSH 0.75 0.30 - 5.00 uIU/mL    Narrative    Test performed by:  [facility] [address] [place] [address]   HIV Ag/Ab Screen Ponce ([facility])   Result Value Ref Range    HIV Antigen/Antibody Negative Negative    Narrative    Test performed by:  [facility] [address] [place] [address]   Syphilis Screen Ponce (RPR/VDRL) ([facility])   Result Value Ref Range    Syphilis Screen Cascade Non-Reactive Non-Reactive    Narrative    Test performed by:  [facility] [address] [place] [address]   Chlamydia/Gono Amplified ([facility])   Result Value Ref Range    Chlamydia trac,Amplified Prb Negative Negative    N gonorrhoeae,Amplified Prb Negative Negative    Narrative    Test performed by:  [facility] [address] [place] [address]       Assessment & Plan   Vaginal discharge, exam is strongly suggestive of BV.  -- Ordered metronidazole; she preferred topical.    STI screen: gonorrhea, chlamydia, HIV, syphilis negative.    Contraception: UPT negative, interested in Depo.  Not sexually active.  -- Depo started today.  May give x7ndqqma x1year.    Back pain, reassuring exam.  Likely muscular strain due to strenuous work.  -- Conservative cares: ice, heat, acetaminophen PRN.  PT referral placed to work on proper lifting technique and core strength.  May use cyclobenzaprine PRN at night for sleep and muscle relief.  -- Pain worsened by constipation.  I don't see a previous thyroid check; we'll check that in case the constipation has another reversible cause.    Return to clinic as needed.

## 2017-08-28 ENCOUNTER — OFFICE VISIT (OUTPATIENT)
Dept: FAMILY MEDICINE | Facility: CLINIC | Age: 22
End: 2017-08-28

## 2017-08-28 VITALS
HEART RATE: 121 BPM | WEIGHT: 274.8 LBS | BODY MASS INDEX: 43.13 KG/M2 | TEMPERATURE: 98 F | HEIGHT: 67 IN | DIASTOLIC BLOOD PRESSURE: 84 MMHG | SYSTOLIC BLOOD PRESSURE: 126 MMHG

## 2017-08-28 DIAGNOSIS — S39.012D BACK STRAIN, SUBSEQUENT ENCOUNTER: Primary | ICD-10-CM

## 2017-08-28 RX ORDER — ACETAMINOPHEN 325 MG/1
650 TABLET ORAL EVERY 6 HOURS PRN
Qty: 100 TABLET | Refills: 0 | Status: SHIPPED | OUTPATIENT
Start: 2017-08-28

## 2017-08-28 RX ORDER — TIZANIDINE 2 MG/1
2 TABLET ORAL 3 TIMES DAILY PRN
Qty: 30 TABLET | Refills: 0 | Status: SHIPPED | OUTPATIENT
Start: 2017-08-28

## 2017-08-28 NOTE — PROGRESS NOTES
"       SUBJECTIVE       Hector Rm is a 21 year old  female with a PMH significant for:     Patient Active Problem List   Diagnosis     Obesity     Moderate major depression (H)     Deliberate self-cutting     Encounter for initial prescription of injectable contraceptive     Patient presents with:  RECHECK: Follow up on back pain, not getting any better, medication is not helping, sitting and standing hurts   Other: Would like a doctor's note for work     Has been using the flexeril as prescribed but doesn't like how it makes her tired.  Feels like symptoms have gotten worse despite avoiding aggravating activities.   It is still affecting her sleep. She feels the pain even laying in bed. She is defecating and urinating without difficulty. Has some radiation of pain from her lower back down to buttocks but no shooting pain down the entire leg. Left side is most affected. She works as a PCA. No additional injuries since last appointment. She has not been using any analgesics for pain.     Injury initially occurred after starting weight loss training which started at [employer] approximately 6 weeks ago.     PMH, Medications and Allergies were reviewed and updated as needed.    ROS: As above per HPI        OBJECTIVE     Vitals:    08/28/17 1425   BP: 126/84   Pulse: 121   Temp: 98  F (36.7  C)   TempSrc: Oral   Weight: 274 lb 12.8 oz (124.6 kg)   Height: 5' 7\" (170.2 cm)     Body mass index is 43.04 kg/(m^2).  BACK: straight leg test negative (modified), there is pain with palpation of lumbar/paralumbar region particularly in Lower L2/L3 region, forward flexion to 60 degress, back extension to 15 degrees  LEGS: can toe and heel walk short distances  GAIT: normal  GEN: NAD, AAox3, appears stated age  CV: RRR no m/r/g, s1 and s2 noted  HEENT: EOMI, head normocephalic, sclera anicteric, trachea midline  PULM: clear bilaterally without wheezes/rhonchi/rales, non-labored  ABD: obese, soft, non-tender. "   NEURO: GCS 15  PSYCH: euthymic, linear thoughts, no delusions/hallucinations, comprehensible    LABS/IMAGING/EKG  No results found for this or any previous visit (from the past 24 hour(s)).    ASSESSMENT AND PLAN     Back strain, subsequent encounter  Patient is working on weight loss, unfortunately it was her activity to lose weight that caused the injury. She is interested in lifestyle changes to attack her weight which is wonderful, unfortunate this had to happened. I provided encouragement to patient. We'll try tylenol for pain relief and start in physical therapy to supplement her personal training at [employer]. We'll try tizanidine and stop the flexeril, continue for back spasms if not causing similar sedation.     RTC PRN for back pain.       Timothy Driver MD PGY3  [facility]    Discussed with Dr. Marcial Jean who agrees with the above assessment and plan.

## 2017-08-28 NOTE — PROGRESS NOTES
"Preceptor attestation:  Blood pressure 126/84, pulse 121, temperature 98  F (36.7  C), temperature source Oral, height 5' 7\" (170.2 cm), weight 274 lb 12.8 oz (124.6 kg).  Patient seen and discussed with the resident.  Assessment and plan reviewed with resident and agreed upon.  Supervising physician: Marcial Jean  [facility]  "

## 2017-08-28 NOTE — PATIENT INSTRUCTIONS
Start physical therapy - work with our coordinators to schedule these    Take tylenol as needed for back discomfort    Stop the flexeril, try the tizanidine. These may cause drowsiness so use if you can tolerate and is safe to use at work      Referral sent to [facility]  Phone: [phone]  Fax: [phone]  Clinic will contact patient to schedule  es/August 28, 2017

## 2017-08-28 NOTE — LETTER
August 28, 2017      Hector Rm  [address] [address]        To Whom It May Concern:    Hector Rm  was seen on 8/28/17 for evaluation of her back which she recently strained.  Please excuse her from missed work on 8-27 to 8-28/17 due to illness.        Sincerely,        Timothy Driver MD

## 2017-08-28 NOTE — MR AVS SNAPSHOT
After Visit Summary   8/28/2017    Hector Rm    MRN: [id number]           Patient Information     Date Of Birth          1995        Visit Information        Provider Department      8/28/2017 2:10 PM Timothy Driver MD [facility]        Today's Diagnoses     Back strain, subsequent encounter    -  1      Care Instructions    Start physical therapy - work with our coordinators to schedule these    Take tylenol as needed for back discomfort    Stop the flexeril, try the tizanidine. These may cause drowsiness so use if you can tolerate and is safe to use at work          Follow-ups after your visit        Additional Services     PHYSICAL THERAPY REFERRAL                 Future tests that were ordered for you today     Open Future Orders        Priority Expected Expires Ordered    PHYSICAL THERAPY REFERRAL Routine  10/28/2017 8/28/2017            Who to contact     Please call your clinic at [phone] to:    Ask questions about your health    Make or cancel appointments    Discuss your medicines    Learn about your test results    Speak to your doctor   If you have compliments or concerns about an experience at your clinic, or if you wish to file a complaint, please contact [facility] Patient Relations at [phone] or email us at [email or web address]         Additional Information About Your Visit        MyChart Information     Mingleboxt is an electronic gateway that provides easy, online access to your medical records. With [employer], you can request a clinic appointment, read your test results, renew a prescription or communicate with your care team.     To sign up for Mingleboxt visit the website at [email or web address] | [email or web address] | [email or web address]   You will be asked to enter the access code listed below, as well as some personal information. Please follow the directions to create your username and password.     Your access code is: [email or web address]  Expires:  "2017  5:54 PM     Your access code will  in 90 days. If you need help or a new code, please contact your [facility] or call [phone] for assistance.        Care EveryWhere ID     This is your Care EveryWhere ID. This could be used by other organizations to access your [facility] medical records  [id number]        Your Vitals Were     Pulse Temperature Height BMI (Body Mass Index)          121 98  F (36.7  C) (Oral) 5' 7\" (170.2 cm) 43.04 kg/m2         Blood Pressure from Last 3 Encounters:   17 126/84   17 127/82   17 130/84    Weight from Last 3 Encounters:   17 274 lb 12.8 oz (124.6 kg)   17 271 lb 12.8 oz (123.3 kg)   17 271 lb 9.6 oz (123.2 kg)                 Today's Medication Changes          These changes are accurate as of: 17  2:53 PM.  If you have any questions, ask your nurse or doctor.               Start taking these medicines.        Dose/Directions    acetaminophen 325 MG tablet   Commonly known as:  TYLENOL   Used for:  Back strain, subsequent encounter   Started by:  Timothy Driver MD        Dose:  650 mg   Take 2 tablets (650 mg) by mouth every 6 hours as needed for mild pain   Quantity:  100 tablet   Refills:  0       tiZANidine 2 MG tablet   Commonly known as:  ZANAFLEX   Used for:  Back strain, subsequent encounter   Started by:  Timothy Driver MD        Dose:  2 mg   Take 1 tablet (2 mg) by mouth 3 times daily as needed for muscle spasms   Quantity:  30 tablet   Refills:  0            Where to get your medicines      These medications were sent to [facility] - [place], [place] - [address], [place] [address]     Phone:  [phone]     acetaminophen 325 MG tablet    tiZANidine 2 MG tablet                Primary Care Provider Office Phone # Fax #    Jasmina Tracey [phone]       [address] [place] [address]        Equal " Access to Services     [facility] Rosa[facility]ORD[facility] : Hadii aad ku hadscottkori Jeriez, wamarlonda luqcésarha, qaildefonso marionkalinvalentina hickman. So [facility] [phone].    ATENCIÓN: Si habla español, tiene a frankel disposición servicios gratuitos de asistencia lingüística. Llame al [phone].    We comply with applicable federal civil rights laws and [place] laws. We do not discriminate on the basis of race, color, national origin, age, disability sex, sexual orientation or gender identity.            Thank you!     Thank you for choosing [facility]  for your care. Our goal is always to provide you with excellent care. Hearing back from our patients is one way we can continue to improve our services. Please take a few minutes to complete the written survey that you may receive in the mail after your visit with us. Thank you!             Your Updated Medication List - Protect others around you: Learn how to safely use, store and throw away your medicines at www.disposemymeds.org.          This list is accurate as of: 8/28/17  2:53 PM.  Always use your most recent med list.                   Brand Name Dispense Instructions for use Diagnosis    acetaminophen 325 MG tablet    TYLENOL    100 tablet    Take 2 tablets (650 mg) by mouth every 6 hours as needed for mild pain    Back strain, subsequent encounter       medroxyPROGESTERone 150 MG/ML injection    DEPO-PROVERA    1 mL    Inject 1 mL (150 mg) into the muscle every 3 months    Encounter for initial prescription of injectable contraceptive       METFORMIN HCL PO           tiZANidine 2 MG tablet    ZANAFLEX    30 tablet    Take 1 tablet (2 mg) by mouth 3 times daily as needed for muscle spasms    Back strain, subsequent encounter

## 2017-08-29 ENCOUNTER — AMBULATORY - HEALTHEAST (OUTPATIENT)
Dept: ADMINISTRATIVE | Facility: REHABILITATION | Age: 22
End: 2017-08-29

## 2017-08-29 DIAGNOSIS — S39.012A BACK STRAIN: ICD-10-CM

## 2017-09-08 ENCOUNTER — OFFICE VISIT - HEALTHEAST (OUTPATIENT)
Dept: PHYSICAL THERAPY | Facility: REHABILITATION | Age: 22
End: 2017-09-08

## 2017-09-08 DIAGNOSIS — M53.86 DECREASED ROM OF LUMBAR SPINE: ICD-10-CM

## 2017-09-08 DIAGNOSIS — M54.50 ACUTE LEFT-SIDED LOW BACK PAIN WITHOUT SCIATICA: ICD-10-CM

## 2017-09-08 DIAGNOSIS — M62.81 GENERALIZED MUSCLE WEAKNESS: ICD-10-CM

## 2017-09-12 ENCOUNTER — OFFICE VISIT - HEALTHEAST (OUTPATIENT)
Dept: PHYSICAL THERAPY | Facility: REHABILITATION | Age: 22
End: 2017-09-12

## 2017-09-12 DIAGNOSIS — M54.50 ACUTE LEFT-SIDED LOW BACK PAIN WITHOUT SCIATICA: ICD-10-CM

## 2017-09-12 DIAGNOSIS — M62.81 GENERALIZED MUSCLE WEAKNESS: ICD-10-CM

## 2017-09-12 DIAGNOSIS — M53.86 DECREASED ROM OF LUMBAR SPINE: ICD-10-CM

## 2017-09-26 ENCOUNTER — OFFICE VISIT - HEALTHEAST (OUTPATIENT)
Dept: PHYSICAL THERAPY | Facility: REHABILITATION | Age: 22
End: 2017-09-26

## 2017-09-26 DIAGNOSIS — M53.86 DECREASED ROM OF LUMBAR SPINE: ICD-10-CM

## 2017-09-26 DIAGNOSIS — M54.50 ACUTE LEFT-SIDED LOW BACK PAIN WITHOUT SCIATICA: ICD-10-CM

## 2017-09-26 DIAGNOSIS — M62.81 GENERALIZED MUSCLE WEAKNESS: ICD-10-CM

## 2021-04-19 ENCOUNTER — COMMUNICATION - HEALTHEAST (OUTPATIENT)
Dept: SCHEDULING | Facility: CLINIC | Age: 26
End: 2021-04-19

## 2021-06-12 NOTE — PROGRESS NOTES
[facility]   Initial Evaluation    Patient Name: Hector Rm  Date of evaluation: 9/8/2017  PRECAUTIONS: none  Referral Diagnosis: Back Strain  Referring provider: Timothy Driver MD  Visit Diagnosis:     ICD-10-CM    1. Acute left-sided low back pain without sciatica M54.5    2. Decreased ROM of lumbar spine M25.60    3. Generalized muscle weakness M62.81        Assessment:      Pt. is appropriate for skilled PT intervention as outlined in the Plan of Care (POC).  Pt. is a good candidate for skilled PT services to improve pain levels and function.  Signs/sx consistent with acute L>R LBP with mobility deficits, due to recent strain. Question component of lumbar radiculopathy as well; however, patient without L LE sx at most times so difficult to assess. HEP initiated today and patient with good tolerance for all exercises. Plan to continue with extension-based ex, progressing core/glute strength as tolerated to ensure successful return to PLOF, which patient describes as not limited.    Goals:  Pt. will be independent with home exercise program in : 2 weeks  Pt. will decrease use of medication for pain for improved quality of life in : 4 weeks  Pt. will have improved quality of sleep: waking less times/night;in 4 weeks  Pt. will bend: to dress;to clean;for self care;for work;with no pain;in 4 weeks;Comment  Comment:: with full ROM  Patient will twist/turn : in bed;with no pain;for bed mobilitiy;in 4 weeks  Patient will transfer: supine/sit;for in/out of bed;with no pain;in 4 weeks  Patient will decrease : ANILA score;for improved quality of life;in 4 weeks;by _ points  by ___ points: >= 30% from IE    Patient's expectations/goals are realistic.    Barriers to Learning or Achieving Goals:  No Barriers.       Plan / Patient Instructions:        Plan of Care:   Authorization / Certification Number of Visits: DELMY LUNA  Communication with: Referral Source  Patient Related Instruction: Nature of  "Condition;Treatment plan and rationale;Self Care instruction;Basis of treatment;Body mechanics;Posture;Precautions;Next steps;Expected outcome  Times per Week: 1-2  Number of Weeks: 2-4  Number of Visits: up to 8 visits  Precautions / Restrictions : none  Therapeutic Exercise: ROM;Stretching;Strengthening  Neuromuscular Reeducation: kinesio tape;posture;core;TNE  Manual Therapy: soft tissue mobilization;joint mobilization;myofascial release  Modalities: TENS;hot pack (prn)    Plan for next visit: Review HEP, adding LTR as tolerated. Assess KTape effectiveness. Consider MET for ERS R.     Subjective:       History of Present Illness:    Hector is a 21 y.o. female who presents to therapy today with complaints of acute left>right-sided lower back pain.   Onset: Last month. Was lifting at the gym. Did not notice the pain with any specific exercise, it just started afterwards.  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 Still working, but very painful after work.  Duration: Intermittent. Usually worse in the AM  Worse with most movement, coughing, sneezing, getting out of bed, crossing and uncrossing legs, bending  Better with muscle relaxers  Pain Medication: Muscle relaxers every other night  Sleep: Reports waking 1x/night on average due to pain.  Denies previous lumbar surgeries.  No pacemaker or defibrillator.    Pt seeks PT to \"to get the right help and information.\"    Pain Ratin  Pain rating at best: 5  Pain rating at worst: 10  Pain description: aching, dull, pain, sharp, shooting and soreness     Objective:      Patient Outcome Measures :    Modified Oswestry Low Back Pain Disablity Questionnaire  in %: 40   Scores range from 0-100%, where a score of 0% represents minimal pain and maximal function. The minimal clinically important difference is a score reduction of 12%.    Examination  1. Acute left-sided low back pain without sciatica     2. Decreased ROM of lumbar spine     3. " Generalized muscle weakness       Precautions/Restrictions: None  Involved side: left>right  Posture Observation:      General sitting posture is  normal.  General standing posture is normal.    Lumbar ROM: All % of WNL  Date:      *Indicate scale AROM AROM AROM   Lumbar Flexion 75 and PF     Lumbar Extension 75      Right Left Right Left Right Left   Lumbar Sidebending 100 100 and PF       Lumbar Rotation 75 75       Thoracic Flexion      Thoracic Extension      Thoracic Sidebending         Thoracic Rotation             SLR: L 30*, R 60*.  Hip PROM: Hip flexion to 100* on R, 90* on L with (+) pain in lower back.  Palpation and PIVM: Reports mild TTP L lumbar paraspinals and L glute. PIVM N/A today.    Treatment Today     TREATMENT MINUTES COMMENTS   Evaluation 15 Lumbar   Self-care/ Home management     Manual therapy     Neuromuscular Re-education 5 KTape start pattern applied to lumbar spine (HNP)  Pt education provided on benefits, mechanism, use, and precautions.   Therapeutic Activity     Therapeutic Exercises 15 -Discussed diagnosis, prognosis, POC, basis for treatment, and relevant anatomy.  -Reviewed and performed HEP with handouts provided.   Gait training     Modality__________________                Total 35    Blank areas are intentional and mean the treatment did not include these items.            PT Evaluation Code: (Please list factors)  Patient History/Comorbidities: none  Examination: Lumbar  Clinical Presentation: Stable  Clinical Decision Making: Low    Patient History/  Comorbidities Examination  (body structures and functions, activity limitations, and/or participation restrictions) Clinical Presentation Clinical Decision Making (Complexity)   No documented Comorbidities or personal factors 1-2 Elements Stable and/or uncomplicated Low   1-2 documented comorbidities or personal factor 3 Elements Evolving clinical presentation with changing characteristics Moderate   3-4 documented comorbidities  or personal factors 4 or more Unstable and unpredictable High           Hector Thomas  9/8/2017  2:43 PM

## 2021-06-12 NOTE — PROGRESS NOTES
[facility] Daily Progress     Patient Name: Hector Rm  Date: 2017  Visit #: 2  PTA visit #:    RECAUTIONS: none  Referral Diagnosis: Back Strain  Referring provider: Timothy Driver MD  Visit Diagnosis:     ICD-10-CM    1. Acute left-sided low back pain without sciatica M54.5    2. Decreased ROM of lumbar spine M25.60    3. Generalized muscle weakness M62.81          Assessment:     HEP/POC compliance is  good .  Patient is benefitting from skilled physical therapy and is making steady progress toward functional goals.  Patient is appropriate to continue with skilled physical therapy intervention, as indicated by initial plan of care.   Pt demonstrating significantly improved lumbar AROM from previous visit with decreased pain. Reports decreased LBP with HEP and KTape.    Goal Status:  Pt. will be independent with home exercise program in : 2 weeks  Pt. will decrease use of medication for pain for improved quality of life in : 4 weeks  Pt. will have improved quality of sleep: waking less times/night;in 4 weeks  Pt. will bend: to dress;to clean;for self care;for work;with no pain;in 4 weeks;Comment  Comment:: with full ROM  Patient will twist/turn : in bed;with no pain;for bed mobilitiy;in 4 weeks  Patient will transfer: supine/sit;for in/out of bed;with no pain;in 4 weeks  Patient will decrease : ANILA score;for improved quality of life;in 4 weeks;by _ points  by ___ points: >= 30% from IE    Plan / Patient Education:     Continue per POC with extension-based exercises, progressing core/glute strength as tolerated.  Trial 4ped ex next visit. Likely reapply KTape.  SKTC as tolerated.    Subjective:     Pain Ratin/10  Pain getting better. Tape really helpful: it did not hurt as much to sit or lie in different positions. It stayed on about 3 days and then took it off in the shower. No skin itching or irritation. Would like to reapply it today.    Objective:     Pt arrived 17 min  late.  Reviewed HEP with min cueing to ensure correct performance, adding LTR without increased pain.  Lumbar ROM: All % of WNL  Date:      *Indicate scale AROM AROM AROM   Lumbar Flexion 95     Lumbar Extension 100      Right Left Right Left Right Left   Lumbar Sidebending 100 100       Lumbar Rotation 75 and        Thoracic Flexion      Thoracic Extension      Thoracic Sidebending         Thoracic Rotation             Treatment Today     TREATMENT MINUTES COMMENTS   Evaluation     Self-care/ Home management     Manual therapy     Neuromuscular Re-education 4 KTape start pattern applied to lumbar spine (HNP)   Therapeutic Activity     Therapeutic Exercises 10 Ex per flow sheet   Gait training     Modality__________________                Total 14    Blank areas are intentional and mean the treatment did not include these items.       Hector Thomas  9/12/2017

## 2021-06-13 NOTE — PROGRESS NOTES
[facility] Daily Progress     Patient Name: Hector Rm  Date: 2017  Visit #: 3  PTA visit #:    RECAUTIONS: none  Referral Diagnosis: Back Strain  Referring provider: Timothy Driver MD  Visit Diagnosis:     ICD-10-CM    1. Acute left-sided low back pain without sciatica M54.5    2. Decreased ROM of lumbar spine M25.60    3. Generalized muscle weakness M62.81          Assessment:     HEP/POC compliance is  good .  Patient is benefitting from skilled physical therapy and is making steady progress toward functional goals.  Patient is appropriate to continue with skilled physical therapy intervention, as indicated by initial plan of care.   Pt with flare-up in sx today due to recent move, requiring a significant amount of bending, lifting, and carrying. However, patient with good tolerance for manual therapy, demonstrating improved PIVM and reporting decreased pain at L4, L5 post session.    Goal Status:  Pt. will be independent with home exercise program in : 2 weeks  Pt. will decrease use of medication for pain for improved quality of life in : 4 weeks  Pt. will have improved quality of sleep: waking less times/night;in 4 weeks  Pt. will bend: to dress;to clean;for self care;for work;with no pain;in 4 weeks;Comment  Comment:: with full ROM  Patient will twist/turn : in bed;with no pain;for bed mobilitiy;in 4 weeks  Patient will transfer: supine/sit;for in/out of bed;with no pain;in 4 weeks  Patient will decrease : ANILA score;for improved quality of life;in 4 weeks;by _ points  by ___ points: >= 30% from IE    Plan / Patient Education:     Continue per POC with extension-based exercises, progressing core/glute strength as tolerated.  Trial SKTC and 4ped ex next visit. Likely reapply KTape.  Include lumbar PA mobilizations prn.    Subjective:     Pain Ratin/10 left low back.  The low back is really painful; had to move over the past few days and did a lot of  bending/lifting/carrying.  Still doing HEP, as that does help to relieve pain. KTape very helpful.    Objective:     Skin clear over previous KTape application; reapplied today.  Pt education provided on and encouraged use of ice or heat x15-20 min intervals to the low back for pain relief.  Lumbar ROM: All % of WNL  Date:      *Indicate scale AROM AROM AROM   Lumbar Flexion 50 and PF     Lumbar Extension 50 with mild pain      Right Left Right Left Right Left   Lumbar Sidebending 100 with mild pain  75 and PF       Lumbar Rotation         Thoracic Flexion      Thoracic Extension      Thoracic Sidebending         Thoracic Rotation           (+) pain and hypomobility present central and L unilateral PA to L4 and L5.    Treatment Today     TREATMENT MINUTES COMMENTS   Evaluation     Self-care/ Home management     Manual therapy 10 -Prone central and R unilateral PAs to L4, L5 grade II>III as tolerated, 4x30 reps each   Neuromuscular Re-education 4 KTape start pattern applied to lumbar spine (HNP)   Therapeutic Activity     Therapeutic Exercises 10 Ex per flow sheet   Gait training     Modality__________________                Total 24    Blank areas are intentional and mean the treatment did not include these items.       Hector Martha  9/26/2017    [facility] Discharge Summary  Patient Name: Hector Rm  Date: 10/24/2017  Referral Diagnosis: Back Strain  Referring provider: Timothy Driver MD  Visit Diagnosis:   1. Acute left-sided low back pain without sciatica     2. Decreased ROM of lumbar spine     3. Generalized muscle weakness         Goals: ALL PROGRESSING  Pt. will be independent with home exercise program in : 2 weeks  Pt. will decrease use of medication for pain for improved quality of life in : 4 weeks  Pt. will have improved quality of sleep: waking less times/night;in 4 weeks  Pt. will bend: to dress;to clean;for self care;for work;with no pain;in 4 weeks;Comment  Comment:: with  full ROM  Patient will twist/turn : in bed;with no pain;for bed mobilitiy;in 4 weeks  Patient will transfer: supine/sit;for in/out of bed;with no pain;in 4 weeks  Patient will decrease : ANILA score;for improved quality of life;in 4 weeks;by _ points  by ___ points: >= 30% from IE    Patient was seen for 3 visits from 09/08/17 to 09/26/17 with 2 no-show and 1 cancelled missed appointments.  Initially, patient with excellent response to PT, as to be expected; however, as of last visit, patient with significant flare-up in LBP after repetitive bending and lifting with recent move. Pt has not scheduled any further follow-up, but it independent and compliant with HEP.    Therapy will be discontinued at this time.  The patient will need a new referral to resume.    Thank you for your referral.  Hector Thomas  10/24/2017  1:08 PM

## 2021-06-16 NOTE — TELEPHONE ENCOUNTER
Patient calls with concerns of coughing up blood. (blood tinged sputum)  She stated cough started approximately 2 weeks ago, and she noticed red in the mucus, but thought it was food related.  For the last 3 days has not had anything red and continues to cough up blood tinged sputum.Transferred to a  for an appointment.Patient verbalized understanding and agrees with plan.     Leti Samuels RN  [facility] Nurse Advisor  7:40 AM 4/19/2021     Reason for Disposition    Coughing up fabian-colored (reddish-brown) or blood-tinged sputum    Additional Information    Negative: Bluish (or gray) lips or face    Negative: SEVERE difficulty breathing (e.g., struggling for each breath, speaks in single words)    Negative: Rapid onset of cough and has hives    Negative: Coughing started suddenly after medicine, an allergic food or bee sting    Negative: Difficulty breathing after exposure to flames, smoke, or fumes    Negative: Sounds like a life-threatening emergency to the triager    Negative: Previous asthma attacks and this feels like asthma attack    Negative: Chest pain present when not coughing    Negative: Difficulty breathing    Negative: Passed out (i.e., fainted, collapsed and was not responding)    Negative: Patient sounds very sick or weak to the triager    Negative: Coughed up > 1 tablespoon (15 ml) blood (Exception: blood-tinged sputum)    Negative: Fever > 103 F (39.4 C)    Negative: Fever > 101 F (38.3 C) and over 60 years of age    Negative: Fever > 100.0 F (37.8 C) and has diabetes mellitus or a weak immune system (e.g., HIV positive, cancer chemotherapy, organ transplant, splenectomy, chronic steroids)    Negative: Fever > 100.0 F (37.8 C) and bedridden (e.g., nursing home patient, stroke, chronic illness, recovering from surgery)    Negative: Increasing ankle swelling    Negative: Wheezing is present    Protocols used: COUGH-A-OH

## 2022-05-01 ENCOUNTER — HEALTH MAINTENANCE LETTER (OUTPATIENT)
Age: 27
End: 2022-05-01

## 2022-07-26 NOTE — LETTER
February 28, 2017      Hector Rm  [address] [address]        Dear Hector,    Please see below for your test results.    Resulted Orders   HCG Qualitative Urine (UPT)  ([facility])   Result Value Ref Range    HCG Qual Urine NEGATIVE Negative   Chlamydia/Gono Amplified ([facility])   Result Value Ref Range    Chlamydia trac,Amplified Prb Negative Negative    N gonorrhoeae,Amplified Prb Negative Negative    Narrative    Test performed by:  [facility] [address] [place] [address]   Wet Prep ([facility])   Result Value Ref Range    Yeast Wet Prep None none    Motile Trichomonas Wet Prep Negative Negative    Clue Cells Wet Prep Present <20% NONE    WBC WET PREP 2-5 2 - 5    Bacteria Wet Prep Few None    pH Wet Prep Not performed 3.8 - 4.5    Odor Wet Prep None NONE   Syphilis Screen [place] ([facility])   Result Value Ref Range    Syphilis Screen Cascade Non-Reactive Non-Reactive    Narrative    Test performed by:  [facility] [address] [place] [address]   HIV Ag/Ab Screen [place] ([facility])   Result Value Ref Range    HIV Antigen/Antibody Negative Negative    Narrative    Test performed by:  [facility] [address] [place] [address]   GYN Cytology ([facility])   Result Value Ref Range    Lab AP Case Report SEE RESULTS BELOW       Comment:      Gynecologic Cytology Report                       Case: [id number]                                     Authorizing Provider:  Benoit Mak MD            Collected:             02/20/2017 1710              First Screen:          HÉCTOR Palacios    Received:              02/21/2017 0957                                     (ASCP)                                                                         Specimen:    SUREPATH PAP, SCREENING, Endocervical/cervical                                               Lab AP Gyn Interpretation SEE RESULTS BELOW       Comment:      Negative for squamous  "intraepithelial lesion or malignancy  Electronically signed by HÉCTOR Palacios (ASCP) on 2/28/2017 at 11:37   AM      Lab AP Malignant? Normal Normal    Specimen adequacy:       Satisfactory for evaluation, endocervical/transformation zone component present    HPV Reflex? Yes if ASCUS     High Risk? No     Last Mens Period Jan 2017     Lab AP Abnormal Bleeding No     Lab AP Patient Status n/a     Lab AP Birth Control/Hormones None     Lab AP Previous Normal n/a     Lab AP Previous Abnl no     Lab AP Cervical Appearance normal     Narrative    Test performed by:  [facility] [address] [place] [address]       \"Good news. Your PAP and blood tests came back normal.  The PAP should be repeated in 3 years.\"    If you have any questions, please call the clinic to make an appointment.    Sincerely,    Benoit Mak MD  " Attending Attestation (For Attendings USE Only)...

## 2022-11-21 ENCOUNTER — HEALTH MAINTENANCE LETTER (OUTPATIENT)
Age: 27
End: 2022-11-21

## 2023-06-02 ENCOUNTER — HEALTH MAINTENANCE LETTER (OUTPATIENT)
Age: 28
End: 2023-06-02

## 2024-06-22 ENCOUNTER — HEALTH MAINTENANCE LETTER (OUTPATIENT)
Age: 29
End: 2024-06-22